# Patient Record
Sex: FEMALE | Race: ASIAN | NOT HISPANIC OR LATINO | URBAN - METROPOLITAN AREA
[De-identification: names, ages, dates, MRNs, and addresses within clinical notes are randomized per-mention and may not be internally consistent; named-entity substitution may affect disease eponyms.]

---

## 2020-03-13 ENCOUNTER — EMERGENCY (EMERGENCY)
Facility: HOSPITAL | Age: 55
LOS: 1 days | Discharge: HOME | End: 2020-03-13
Admitting: EMERGENCY MEDICINE
Payer: COMMERCIAL

## 2020-03-13 VITALS
HEART RATE: 97 BPM | OXYGEN SATURATION: 99 % | DIASTOLIC BLOOD PRESSURE: 90 MMHG | RESPIRATION RATE: 18 BRPM | SYSTOLIC BLOOD PRESSURE: 170 MMHG | TEMPERATURE: 98 F

## 2020-03-13 PROCEDURE — 99282 EMERGENCY DEPT VISIT SF MDM: CPT

## 2020-03-13 NOTE — ED PROVIDER NOTE - OBJECTIVE STATEMENT
53 y/o F w/ PMH HTN presents to the ED requesting testing for Covid-19. Pt states her sister in law passed away and was found to have Covid-19. Pt's last contact with her sister in law was on 03-06. Denies any physical complaints. Denies any congestion, sore throat, chest pain, shortness of breath, or cough. 53 y/o Female with a PMHx of HTN presents to the ER requesting testing for Covid-19. Pt states her sister in law passed away and was found to have Covid-19. Pt's last contact with her sister in law was on 03-06. Denies any physical complaints. Denies congestion, sore throat, chest pain, shortness of breath, or cough.

## 2020-03-13 NOTE — ED PROVIDER NOTE - CLINICAL SUMMARY MEDICAL DECISION MAKING FREE TEXT BOX
55 y/o F w/ PMH HTN presents to the ED requesting testing for Covid-19. Pt advised to self quarantine. Pt given strict return instructions. 53 y/o Female with a PMHx of  HTN presents to the ER requesting testing for Covid-19 denies any symptoms pt reports +exposure. Pt advised to self quarantine. Pt given strict return instructions.

## 2020-03-13 NOTE — ED PROVIDER NOTE - CHPI ED SYMPTOMS NEG
no shortness of breath/no cough/no congestion, no sore throat, no chest pain no shortness of breath/no cough

## 2020-03-13 NOTE — ED PROVIDER NOTE - PATIENT PORTAL LINK FT
You can access the FollowMyHealth Patient Portal offered by [facility] by registering at the following website: [email or web address]. By joining [facility]’s FollowMyHealth portal, you will also be able to view your health information using other applications (apps) compatible with our system.

## 2020-03-13 NOTE — ED PROVIDER NOTE - NSFOLLOWUPINSTRUCTIONS_ED_ALL_ED_FT
Follow up with your Primary Medical Doctor,  Please avoid contact with others for the next two weeks.  It is very important to be diligent about hand washing & hygiene to avoid spreading the illness to others.  Return to the ER for concerning symptoms such as high fever, shortness of breath, difficulty breathing or any concerning symptoms.  Please see attached informational sheet regarding COVID.